# Patient Record
Sex: MALE | Race: OTHER | URBAN - METROPOLITAN AREA
[De-identification: names, ages, dates, MRNs, and addresses within clinical notes are randomized per-mention and may not be internally consistent; named-entity substitution may affect disease eponyms.]

---

## 2018-09-07 ENCOUNTER — OFFICE VISIT (OUTPATIENT)
Dept: FAMILY MEDICINE CLINIC | Age: 38
End: 2018-09-07

## 2018-09-07 ENCOUNTER — HOSPITAL ENCOUNTER (OUTPATIENT)
Dept: LAB | Age: 38
Discharge: HOME | End: 2018-09-07

## 2018-09-07 VITALS
WEIGHT: 201 LBS | HEIGHT: 68 IN | HEART RATE: 55 BPM | OXYGEN SATURATION: 99 % | SYSTOLIC BLOOD PRESSURE: 112 MMHG | DIASTOLIC BLOOD PRESSURE: 75 MMHG | TEMPERATURE: 98.2 F | BODY MASS INDEX: 30.46 KG/M2

## 2018-09-07 DIAGNOSIS — R09.81 CONGESTION OF NASAL SINUS: Primary | ICD-10-CM

## 2018-09-07 DIAGNOSIS — R06.02 SOB (SHORTNESS OF BREATH): ICD-10-CM

## 2018-09-07 LAB
ALBUMIN SERPL-MCNC: 4.4 G/DL (ref 3.5–5)
ALBUMIN/GLOB SERPL: 1.6 {RATIO} (ref 1.1–2.2)
ALP SERPL-CCNC: 90 U/L (ref 45–117)
ALT SERPL-CCNC: 64 U/L (ref 12–78)
ANION GAP SERPL CALC-SCNC: 7 MMOL/L (ref 5–15)
AST SERPL-CCNC: 36 U/L (ref 15–37)
BASOPHILS # BLD: 0 K/UL (ref 0–0.1)
BASOPHILS NFR BLD: 1 % (ref 0–1)
BILIRUB SERPL-MCNC: 0.9 MG/DL (ref 0.2–1)
BNP SERPL-MCNC: 13 PG/ML (ref 0–125)
BUN SERPL-MCNC: 22 MG/DL (ref 6–20)
BUN/CREAT SERPL: 23 (ref 12–20)
CALCIUM SERPL-MCNC: 8.7 MG/DL (ref 8.5–10.1)
CHLORIDE SERPL-SCNC: 104 MMOL/L (ref 97–108)
CO2 SERPL-SCNC: 29 MMOL/L (ref 21–32)
COMMENT, HOLDF: NORMAL
CREAT SERPL-MCNC: 0.97 MG/DL (ref 0.7–1.3)
DIFFERENTIAL METHOD BLD: NORMAL
EOSINOPHIL # BLD: 0.1 K/UL (ref 0–0.4)
EOSINOPHIL NFR BLD: 2 % (ref 0–7)
ERYTHROCYTE [DISTWIDTH] IN BLOOD BY AUTOMATED COUNT: 12.3 % (ref 11.5–14.5)
EST. AVERAGE GLUCOSE BLD GHB EST-MCNC: 108 MG/DL
FERRITIN SERPL-MCNC: 271 NG/ML (ref 26–388)
GLOBULIN SER CALC-MCNC: 2.8 G/DL (ref 2–4)
GLUCOSE SERPL-MCNC: 87 MG/DL (ref 65–100)
HBA1C MFR BLD: 5.4 % (ref 4.2–6.3)
HCT VFR BLD AUTO: 47.6 % (ref 36.6–50.3)
HGB BLD-MCNC: 15.9 G/DL (ref 12.1–17)
IMM GRANULOCYTES # BLD: 0 K/UL (ref 0–0.04)
IMM GRANULOCYTES NFR BLD AUTO: 0 % (ref 0–0.5)
LYMPHOCYTES # BLD: 1.8 K/UL (ref 0.8–3.5)
LYMPHOCYTES NFR BLD: 41 % (ref 12–49)
MCH RBC QN AUTO: 31.2 PG (ref 26–34)
MCHC RBC AUTO-ENTMCNC: 33.4 G/DL (ref 30–36.5)
MCV RBC AUTO: 93.5 FL (ref 80–99)
MONOCYTES # BLD: 0.6 K/UL (ref 0–1)
MONOCYTES NFR BLD: 13 % (ref 5–13)
NEUTS SEG # BLD: 1.9 K/UL (ref 1.8–8)
NEUTS SEG NFR BLD: 43 % (ref 32–75)
NRBC # BLD: 0 K/UL (ref 0–0.01)
NRBC BLD-RTO: 0 PER 100 WBC
PLATELET # BLD AUTO: 152 K/UL (ref 150–400)
PMV BLD AUTO: 12.3 FL (ref 8.9–12.9)
POTASSIUM SERPL-SCNC: 4.1 MMOL/L (ref 3.5–5.1)
PROT SERPL-MCNC: 7.2 G/DL (ref 6.4–8.2)
RBC # BLD AUTO: 5.09 M/UL (ref 4.1–5.7)
SAMPLES BEING HELD,HOLD: NORMAL
SODIUM SERPL-SCNC: 140 MMOL/L (ref 136–145)
WBC # BLD AUTO: 4.3 K/UL (ref 4.1–11.1)

## 2018-09-07 PROCEDURE — 83880 ASSAY OF NATRIURETIC PEPTIDE: CPT | Performed by: NURSE PRACTITIONER

## 2018-09-07 PROCEDURE — 83036 HEMOGLOBIN GLYCOSYLATED A1C: CPT | Performed by: NURSE PRACTITIONER

## 2018-09-07 PROCEDURE — 86480 TB TEST CELL IMMUN MEASURE: CPT | Performed by: NURSE PRACTITIONER

## 2018-09-07 PROCEDURE — 80053 COMPREHEN METABOLIC PANEL: CPT | Performed by: NURSE PRACTITIONER

## 2018-09-07 PROCEDURE — 85025 COMPLETE CBC W/AUTO DIFF WBC: CPT | Performed by: NURSE PRACTITIONER

## 2018-09-07 PROCEDURE — 82728 ASSAY OF FERRITIN: CPT | Performed by: NURSE PRACTITIONER

## 2018-09-07 RX ORDER — FLUTICASONE PROPIONATE 50 MCG
SPRAY, SUSPENSION (ML) NASAL
Qty: 1 BOTTLE | Refills: 5 | Status: SHIPPED | OUTPATIENT
Start: 2018-09-07 | End: 2018-10-19

## 2018-09-07 RX ORDER — LORATADINE 10 MG/1
10 TABLET ORAL DAILY
Qty: 30 TAB | Refills: 6 | Status: SHIPPED | OUTPATIENT
Start: 2018-09-07 | End: 2018-10-19

## 2018-09-07 NOTE — PROGRESS NOTES
Coordination of Care  1. Have you been to the ER, urgent care clinic since your last visit? Hospitalized since your last visit? No    2. Have you seen or consulted any other health care providers outside of the [address] [place] [address] since your last visit? Include any pap smears or colon screening. No    Does the patient need refills? N/A    Learning Assessment Complete?  yes

## 2018-09-07 NOTE — PATIENT INSTRUCTIONS
Cómo manejar las alergias: Instrucciones de cuidado - [ Managing Your Allergies: Care Instructions ]  Instrucciones de cuidado    Manejar las alergias es selam parte importante de mantenerse saludable. Ahuja médico le ayudará a encontrar lo que puede estar provocando las [place]. Las causas comunes de los síntomas de alergia son el polvo y los ácaros del polvo que se encuentran en el [facility], la caspa de los [place], [place] moho y el polen. Guardado pronto alyse sepa qué desencadena los síntomas, trate de reducir ahuja exposición a esos desencadenantes. [place] puede ayudar a [employer] de [place], asma y [de-identified] problemas de [place]. Pregúntele a ahuja médico sobre los medicamentos antialérgicos o la inmunoterapia. Estos tratamientos pueden ayudar a reducir o a prevenir los síntomas alérgicos. La atención de seguimiento es selam parte clave de ahuja tratamiento y seguridad. Asegúrese de hacer y acudir a todas las citas, y llame a ahuja médico si está teniendo problemas. También es selam buena idea saber los resultados de colette exámenes y mantener selam lista de los medicamentos que mina. ¿Cómo puede cuidarse en el [facility]? · Si piensa que el polvo o los ácaros del polvo son la causa de colette alergias:  ¨ Lave las sábanas, las fundas de las almohadas y demás ropa de cama con [place] todas las semanas. ¨ Use fundas herméticas y a prueba de polvo para almohadas, edredones y colchones. Evite los cobertores de plástico, porque suelen rasgarse rápidamente y no \"respiran\". Lávelos de acuerdo con las instrucciones. ¨ Quite las [address] [place] [address]  y las [employer] que no necesite. ¨ Use mantas que se puedan lisa a máquina. ¨ No use humidificadores. Pueden ayudar a que los [employer] del polvo vivan [place]. · Use el acondicionador de aire. Cambie o limpie todos los filtros selam vez al mes. [facility]. Use filtros de aire de alto rendimiento. No use ventiladores de ventana ni de ático, que Samy Pérez.   · Si es alérgico a la caspa de los [place], no deje que las mascotas entren a la casa o, por lo menos, no deje que entren en ahuja habitación. Las alfombras [place] y los muebles tapizados con ayush pueden albergar mucha caspa de [place]. Abad vez tenga que reemplazarlos. · Busque rastros de cucarachas. Use cebos para cucarachas para eliminarlas. Luego limpie dedrick toda la casa. · Si es alérgico al moho, no tenga plantas de interior porque puede crecer moho en la eliezer. Deshágase de los [place], los tapetes y las sushil que tengan olor a humedad. Revise que no haya moho en el baño. · Si es alérgico al polen, no salga cuando la concentración de polen sea ever. · No fume ni deje que otras personas lo tara en ahuja hogar. No use chimeneas ni estufas de leña. Evite los vapores de la pintura, los perfumes y otros olores manolo. ¿Cuándo debe pedir ayuda? Aplíquese selam inyección de epinefrina si:    · Piensa que está teniendo [facility] reacción alérgica grave.    Después de aplicarse selam inyección de epinefrina, llame al 911 incluso si se siente mejor.   Llame al 911 si:    · Tiene síntomas de selam reacción alérgica grave. Estos pueden incluir:  ¨ Zonas abultadas y enrojecidas (ronchas) que aparecen repentinamente por todo el cuerpo. ¨ Hinchazón de la garganta, la boca, los labios o la [place]. ¨ Dificultad para respirar. ¨ Pérdida del conocimiento (desmayo). O podría sentirse muy mareado o de repente sentirse débil, confuso o agitado.     · Le tolbert aplicado selam inyección de epinefrina, incluso si se siente mejor.    Llame a ahuja médico ahora mismo o busque atención médica inmediata si:    · Tiene síntomas de selam reacción alérgica, tales alyse:  ¨ Salpullido o ronchas (zonas abultadas y enrojecidas en la piel). ¨ Comezón. ¨ Hinchazón.   ¨ Dolor abdominal, náuseas o vómito.    Preste especial atención a los cambios en ahuja margarito y asegúrese de comunicarse con ahuja médico si:    · Las alergias empeoran.     · Tiene dificultad para controlar las alergias.     · Tiene preguntas sobre las pruebas de [place].     · No mejora alyse se esperaba. ¿Dónde puede encontrar más información en inglés? Garrick Landis a http://kavon-ellie.info/. Dayana Downing X875 en la búsqueda para aprender más acerca de \"Cómo manejar las alergias: Instrucciones de cuidado - [ Managing Your Allergies: Care Instructions ]. \"  Revisado: 6 octubre, 2017  Versión del contenido: 11.7  © 4591-4574 Healthwise, Incorporated. Las instrucciones de cuidado fueron adaptadas bajo licencia por [employer] (which disclaims liability or warranty for this information). Si usted tiene [place] afección médica o sobre estas instrucciones, siempre pregunte a ahuja profesional de margarito. Healthwise, Incorporated niega toda garantía o responsabilidad por ahuja uso de esta información.

## 2018-09-07 NOTE — PROGRESS NOTES
Subjective:     Chief Complaint   Patient presents with    Shortness of Breath    Nasal Congestion        He  is a 41 y.o. male who presents for evaluation of congestion and corresponding SOB. Onset approx 1 month ago. No assoc cough, sputum production, fevers/chills nor CP/palpitations. No assoc w/ particular time of day. Has been using OTC saline spray which has helped some. No Hx of lung DOs/infections. No other new Rx. Denies exposure/contact w/ TB, no low ext edema nor throat/ear pain. ROS  Gen - no fever/chills  Resp - no dyspnea or cough  CV - no chest pain or BLANTON  Rest per HPI    No past medical history on file. No past surgical history on file. No current outpatient prescriptions on file prior to visit. No current facility-administered medications on file prior to visit. Objective:     Vitals:    09/07/18 1031   BP: 112/75   Pulse: (!) 55   Temp: 98.2 °F (36.8 °C)   TempSrc: Oral   SpO2: 99%   Weight: 201 lb (91.2 kg)   Height: 5' 8.11\" (1.73 m)       Physical Examination:  General appearance - alert, well appearing, and in no distress  Eyes -sclera anicteric  Neck - supple, no significant adenopathy, no thyromegaly  Chest - clear to auscultation, no wheezes, rales or rhonchi, symmetric air entry  Heart - normal rate, regular rhythm, normal S1, S2, no murmurs, rubs, clicks or gallops  Neurological - alert, oriented, no focal findings or movement disorder noted  Abdomen-BS present/WNL x 4 quads, non-tender/distended, soft,no organomegaly    HENT exam WNL save mod inflammed turbinates. Assessment/ Plan:   Diagnoses and all orders for this visit:    1. Congestion of nasal sinus  -     fluticasone (FLONASE) 50 mcg/actuation nasal spray; 2 sprays in each nare QHS. 2 spray en cada fossa nasal por la noche.  -     loratadine (CLARITIN) 10 mg tablet; Take 1 Tab by mouth daily. [place] 1 tableta por boca diario.     2. SOB (shortness of breath)  -     CBC WITH AUTOMATED DIFF; Future  -     QUANTIFERON TB GOLD(CLIENT INCUB.); Future  -     METABOLIC PANEL, COMPREHENSIVE; Future  -     HEMOGLOBIN A1C WITH EAG; Future  -     BNP; Future  -     FERRITIN; Future      No concerning S&S in HPI nor on HENT, CV nor lung exam.   Defer imaging at this point. Check baseline labs and r/t SOB. Start claritin daily w/ QHS Flonase. Re-assess in 6 weeks. I have discussed the diagnosis with the patient and the intended plan as seen in the above orders. The patient has received an after-visit summary and questions were answered concerning future plans. I have discussed medication side effects and warnings with the patient as well. The patient verbalizes understanding and agreement with the plan.     Follow-up Disposition: Not on File

## 2018-09-07 NOTE — PROGRESS NOTES
Printed AVS, provided to pt and reviewed. Pt indicated understanding and had no questions. Told pt that rx's have been sent to pharmacy and they should be ready for  in approximately 2 hrs. Please present GoodRx. com coupon which we provide to your pharmacy to receive discounted price. Reviewed all medication that was ordered today with the pt. Navjot Hilton was the .  Juani Greer RN

## 2018-09-11 NOTE — PROGRESS NOTES
Lab testing neg for overt causes of SOB.  Discuss S&S/improvement at [facility] scheduled next month, cont POC

## 2018-09-12 LAB
ANNOTATION COMMENT IMP: NORMAL
M TB IFN-G CD4+ BCKGRND COR BLD-ACNC: 0 IU/ML
M TB IFN-G CD4+ T-CELLS BLD-ACNC: 0.09 IU/ML
M TB TUBERC IFN-G BLD QL: NEGATIVE
M TB TUBERC IGNF/MITOGEN IGNF CONTROL: >10 IU/ML
QUANTIFERON NIL VALUE: 0.09 IU/ML
SERVICE CMNT-IMP: NORMAL

## 2018-10-19 ENCOUNTER — OFFICE VISIT (OUTPATIENT)
Dept: FAMILY MEDICINE CLINIC | Age: 38
End: 2018-10-19

## 2018-10-19 VITALS
HEIGHT: 69 IN | HEART RATE: 70 BPM | DIASTOLIC BLOOD PRESSURE: 73 MMHG | WEIGHT: 210 LBS | TEMPERATURE: 98.2 F | BODY MASS INDEX: 31.1 KG/M2 | SYSTOLIC BLOOD PRESSURE: 104 MMHG

## 2018-10-19 DIAGNOSIS — J30.1 SEASONAL ALLERGIC RHINITIS DUE TO POLLEN: Primary | ICD-10-CM

## 2018-10-19 RX ORDER — CETIRIZINE HCL 10 MG
10 TABLET ORAL DAILY
Qty: 30 TAB | Refills: 5 | Status: SHIPPED | OUTPATIENT
Start: 2018-10-19

## 2018-10-19 RX ORDER — TRIAMCINOLONE ACETONIDE 55 UG/1
2 SPRAY, METERED NASAL DAILY
Qty: 1 BOTTLE | Refills: 5 | Status: SHIPPED | OUTPATIENT
Start: 2018-10-19

## 2018-10-19 NOTE — PATIENT INSTRUCTIONS
Rinitis: Instrucciones de cuidado - [ Rhinitis: Care Instructions ]  Instrucciones de cuidado  La rinitis es selam hinchazón e irritación en la nariz. Con frecuencia, las alergias y las infecciones son la causa. Puede tener congestión o secreción nasal. Otros síntomas son la comezón y la irritación de ojos, oídos, garganta y boca. Si las alergias son la causa, es posible que el médico le wendy pruebas para averiguar a qué es alérgico. Abad vez pueda detener los síntomas si mahendra las cosas que los causan. El médico puede sugerir o recetar medicamentos para [employer]. La atención de seguimiento es selam parte clave de ahuja tratamiento y seguridad. Asegúrese de hacer y acudir a todas las citas, y llame a ahuja médico si está teniendo problemas. También es selam buena idea saber los resultados de colette exámenes y mantener selam lista de los medicamentos que mina. ¿Cómo puede cuidarse en el hogar? · Si ahuja rinitis es causada por alergias, trate de averiguar qué es lo que [employer]. [place] pasos para evitar los desencadenantes. ? Evite los trabajos [facility]. Estos pueden remover el polen y el moho. ? No fume ni permita que otros fumen cerca de usted. Si necesita ayuda para dejar de fumar, hable con ahuja médico sobre programas y medicamentos para dejar de fumar. Estos pueden aumentar colette probabilidades de dejar el hábito para siempre. ? No use aerosoles, productos de limpieza ni perfumes. ? [address] [facility] [address] de ahuja casa y automóvil jo-ann la época de floración si el polen es yusuf de los desencadenantes. ? Limpie ahuja casa con frecuencia para controlar el polvo. ? Mantenga las [place] fuera de ahuja casa. · Si ahuja médico le recomienda un medicamento de venta lisa para [employer] síntomas, tómelo exactamente alyse le fue recetado. Llame a ahuja médico si karen estar teniendo problemas con ahuja medicamento.   · Use lavados nasales con solución salina (agua salada) para ayudar a [employer] fosas nasales despejadas y eliminar la mucosidad y las bacterias. Puede comprar gotas nasales de solución salina en un supermercado o selam farmacia. O puede prepararlas usted mismo en casa agregándole 1 cucharadita de sal y 1 cucharadita de bicarbonato de sodio a 2 tazas de agua destilada. Si las prepara usted mismo, llene selam suellen de goma con la solución, introduzca la punta en la fosa nasal y apriete con suavidad. Suénese la nariz. ¿Cuándo debe pedir ayuda? Llame a ahuja médico ahora mismo o busque atención médica inmediata si:    · Tiene problemas para respirar.    Preste especial atención a los cambios en ahuja margarito y asegúrese de comunicarse con ahuja médico si:    · La mucosidad de la nariz se vuelve más espesa (alyse pus) o contiene kevin.     · Tiene síntomas nuevos o que empeoran.     · No mejora alyse se esperaba. ¿Dónde puede encontrar más información en inglés? Harshal Estrada a http://kavon-ellie.info/. Ham Bartonerer U166 en la búsqueda para aprender más acerca de \"Rinitis: Instrucciones de cuidado - [ Rhinitis: Care Instructions ]. \"  Revisado: [address], 2018  Versión del contenido: 11.8  © 1938-7598 Healthwise, Incorporated. Las instrucciones de cuidado fueron adaptadas bajo licencia por [employer] (which disclaims liability or warranty for this information). Si usted tiene [place] afección médica o sobre estas instrucciones, siempre pregunte a ahuja profesional de margarito. Healthwise, Incorporated niega toda garantía o responsabilidad por ahuja uso de esta información.

## 2018-10-19 NOTE — PROGRESS NOTES
Patient left without seeing the dc nurse. No avs given by this nurse. Per provider he only needed to see DC nurse for flu vaccine.  Baldomero Villarreal RN

## 2018-10-19 NOTE — PROGRESS NOTES
Assessment/Plan:    Diagnoses and all orders for this visit:    Seasonal allergic rhinitis due to pollen    Other orders  -     triamcinolone (NASACORT AQ) 55 mcg nasal inhaler; 2 Sprays by Both Nostrils route daily. -     cetirizine (ZYRTEC) 10 mg tablet; Take 1 Tab by mouth daily. If no improvement on this tx, come back in 1 month. If doing better, may return PRN     Follow-up Disposition:  Return in about 3 months (around 2019), or if symptoms worsen or fail to improve. Lynnetta Burdock McFerren, PA-C [place] expressed understanding of this plan. An AVS was printed and given to the patient.      ----------------------------------------------------------------------    Chief Complaint   Patient presents with    Nasal Congestion     Nasal Congestion recheck       History of Present Illness:    Pt returns for the same problem with severely \"stuffy nose\" can't breathe through one nostril a lot of nights. The flonase/ claritin combo helped \"some\" but not every day. He used the medication as directed. He is not having cough or fever, only nasal congestion      No past medical history on file. Current Outpatient Medications   Medication Sig Dispense Refill    triamcinolone (NASACORT AQ) 55 mcg nasal inhaler 2 Sprays by Both Nostrils route daily. 1 Bottle 5    cetirizine (ZYRTEC) 10 mg tablet Take 1 Tab by mouth daily. 30 Tab 5       No Known Allergies    Social History     Tobacco Use    Smoking status: Former Smoker     Last attempt to quit: 2018     Years since quittin.1    Smokeless tobacco: Former User    Tobacco comment: 2 cigs   Substance Use Topics    Alcohol use: No     Alcohol/week: 0.0 oz     Comment: occ    Drug use: No       No family history on file.     Physical Exam:     Visit Vitals  /73 (BP 1 Location: Left arm, BP Patient Position: Sitting)   Pulse 70   Temp 98.2 °F (36.8 °C) (Oral)   Ht 5' 8.5\" (1.74 m)   Wt 210 lb (95.3 kg)   BMI 31.47 kg/m² A&Ox3  WDWN NAD  Respirations normal and non labored  On exam, he has sanjuanita swollen glistening turbinates that crowd most of his nasal passageways sanjuanita